# Patient Record
Sex: FEMALE | Race: WHITE | Employment: UNEMPLOYED | URBAN - METROPOLITAN AREA
[De-identification: names, ages, dates, MRNs, and addresses within clinical notes are randomized per-mention and may not be internally consistent; named-entity substitution may affect disease eponyms.]

---

## 2017-12-08 ENCOUNTER — HOSPITAL ENCOUNTER (OUTPATIENT)
Dept: MAMMOGRAPHY | Age: 57
Discharge: HOME | End: 2017-12-08
Attending: NURSE PRACTITIONER | Admitting: NURSE PRACTITIONER

## 2017-12-08 DIAGNOSIS — Z12.31 ENCOUNTER FOR SCREENING MAMMOGRAM FOR BREAST CANCER: ICD-10-CM

## 2017-12-26 ENCOUNTER — HOSPITAL ENCOUNTER (OUTPATIENT)
Dept: MAMMOGRAPHY | Age: 57
Discharge: HOME | End: 2017-12-26

## 2017-12-26 DIAGNOSIS — R92.8 ABNORMAL FINDING ON BREAST IMAGING: ICD-10-CM

## 2017-12-26 DIAGNOSIS — R92.8 ABNORMAL MAMMOGRAM: ICD-10-CM

## 2018-01-12 ENCOUNTER — NURSE ONLY (OUTPATIENT)
Dept: MAMMOGRAPHY | Age: 58
End: 2018-01-12

## 2018-01-12 ENCOUNTER — HOSPITAL ENCOUNTER (OUTPATIENT)
Dept: MAMMOGRAPHY | Age: 58
Discharge: HOME | End: 2018-01-12
Attending: NURSE PRACTITIONER | Admitting: NURSE PRACTITIONER

## 2018-01-12 DIAGNOSIS — Z98.890 STATUS POST BREAST BIOPSY: ICD-10-CM

## 2018-01-12 DIAGNOSIS — R92.8 ABNORMAL MAMMOGRAM: ICD-10-CM

## 2018-01-12 DIAGNOSIS — R92.0 ABNORMAL FINDING ON MAMMOGRAPHY, MICROCALCIFICATION: ICD-10-CM

## 2018-01-12 NOTE — PROGRESS NOTES
Patient in [facility] for breast biopsy. Radiologist reviews procedure with patient and consent is signed. Patient tolerates stereotactic breast biopsy- left and ultrasound guided breast biopsy well.  3 sites- one on left breast two on right breast. Compression held. Sites cleansed with chloraprep and steri strips and dry dressings applied. Ice packs provided. Discharge instructions reviewed, patient verbalizes understanding and copy given. Patient is A&Ox3 and steady on feet and discharged to waiting room where her  is waiting for her. Patient agrees to contact Nurse Navigator with any questions.

## 2018-01-24 ENCOUNTER — HOSPITAL ENCOUNTER (OUTPATIENT)
Dept: MRI IMAGING | Age: 58
Discharge: HOME | End: 2018-01-24
Attending: SURGERY | Admitting: SURGERY

## 2018-01-24 DIAGNOSIS — C50.411 MALIGNANT NEOPLASM OF UPPER-OUTER QUADRANT OF RIGHT FEMALE BREAST (HCC): ICD-10-CM

## 2018-01-24 DIAGNOSIS — C50.412 MALIGNANT NEOPLASM OF UPPER-OUTER QUADRANT OF LEFT FEMALE BREAST, UNSPECIFIED ESTROGEN RECEPTOR STATUS (HCC): ICD-10-CM

## 2018-01-24 DIAGNOSIS — C50.411 MALIGNANT NEOPLASM OF UPPER-OUTER QUADRANT OF RIGHT FEMALE BREAST, UNSPECIFIED ESTROGEN RECEPTOR STATUS (HCC): ICD-10-CM

## 2018-01-31 ENCOUNTER — HOSPITAL ENCOUNTER (OUTPATIENT)
Dept: MRI IMAGING | Age: 58
Discharge: HOME | End: 2018-01-31
Attending: SURGERY | Admitting: SURGERY

## 2018-01-31 DIAGNOSIS — C50.412 MALIGNANT NEOPLASM OF UPPER-OUTER QUADRANT OF LEFT FEMALE BREAST, UNSPECIFIED ESTROGEN RECEPTOR STATUS (HCC): ICD-10-CM

## 2018-01-31 DIAGNOSIS — C50.411 MALIGNANT NEOPLASM OF UPPER-OUTER QUADRANT OF RIGHT FEMALE BREAST, UNSPECIFIED ESTROGEN RECEPTOR STATUS (HCC): ICD-10-CM

## 2018-01-31 DIAGNOSIS — C50.411 MALIGNANT NEOPLASM OF UPPER-OUTER QUADRANT OF RIGHT FEMALE BREAST (HCC): ICD-10-CM

## 2018-02-09 PROBLEM — C50.411 BREAST CANCER OF UPPER-OUTER QUADRANT OF RIGHT FEMALE BREAST (HCC): Status: ACTIVE | Noted: 2018-02-09

## 2018-04-06 ENCOUNTER — HOSPITAL ENCOUNTER (OUTPATIENT)
Dept: GENERAL RADIOLOGY | Age: 58
Discharge: HOME | End: 2018-04-06
Attending: NURSE PRACTITIONER | Admitting: NURSE PRACTITIONER

## 2018-04-06 DIAGNOSIS — Z13.820 SCREENING FOR OSTEOPOROSIS: ICD-10-CM

## 2018-04-06 DIAGNOSIS — Z13.820 ENCOUNTER FOR SCREENING FOR OSTEOPOROSIS: ICD-10-CM

## 2018-07-12 ENCOUNTER — HOSPITAL ENCOUNTER (OUTPATIENT)
Dept: CT IMAGING | Age: 58
Discharge: HOME | End: 2018-07-12
Attending: INTERNAL MEDICINE | Admitting: INTERNAL MEDICINE

## 2018-07-12 DIAGNOSIS — C50.919 MALIGNANT NEOPLASM OF FEMALE BREAST, UNSPECIFIED ESTROGEN RECEPTOR STATUS, UNSPECIFIED LATERALITY, UNSPECIFIED SITE OF BREAST (HCC): ICD-10-CM

## 2018-07-12 DIAGNOSIS — C50.111 MALIGNANT NEOPLASM OF CENTRAL PORTION OF RIGHT FEMALE BREAST (HCC): ICD-10-CM

## 2018-10-31 ENCOUNTER — APPOINTMENT (OUTPATIENT)
Dept: GENERAL RADIOLOGY | Age: 58
End: 2018-10-31
Payer: COMMERCIAL

## 2018-10-31 ENCOUNTER — HOSPITAL ENCOUNTER (EMERGENCY)
Age: 58
Discharge: HOME | End: 2018-10-31
Payer: COMMERCIAL

## 2018-10-31 VITALS
BODY MASS INDEX: 28.32 KG/M2 | OXYGEN SATURATION: 98 % | HEIGHT: 65 IN | DIASTOLIC BLOOD PRESSURE: 87 MMHG | SYSTOLIC BLOOD PRESSURE: 150 MMHG | WEIGHT: 170 LBS | HEART RATE: 65 BPM | TEMPERATURE: 97.8 F | RESPIRATION RATE: 15 BRPM

## 2018-10-31 DIAGNOSIS — S93.401A SPRAIN OF RIGHT ANKLE, UNSPECIFIED LIGAMENT, INITIAL ENCOUNTER: Primary | ICD-10-CM

## 2018-10-31 PROCEDURE — 99283 EMERGENCY DEPT VISIT LOW MDM: CPT

## 2018-10-31 PROCEDURE — 73610 X-RAY EXAM OF ANKLE: CPT

## 2018-10-31 PROCEDURE — 6370000000 HC RX 637 (ALT 250 FOR IP): Performed by: PHYSICIAN ASSISTANT

## 2018-10-31 PROCEDURE — L4360 PNEUMAT WALKING BOOT PRE CST: HCPCS

## 2018-10-31 RX ORDER — HYDROCODONE BITARTRATE AND ACETAMINOPHEN 5; 325 MG/1; MG/1
1 TABLET ORAL EVERY 6 HOURS PRN
Qty: 10 TABLET | Refills: 0 | Status: SHIPPED | OUTPATIENT
Start: 2018-10-31 | End: 2018-11-03

## 2018-10-31 RX ORDER — HYDROCODONE BITARTRATE AND ACETAMINOPHEN 5; 325 MG/1; MG/1
1 TABLET ORAL ONCE
Status: COMPLETED | OUTPATIENT
Start: 2018-10-31 | End: 2018-10-31

## 2018-10-31 RX ADMIN — HYDROCODONE BITARTRATE AND ACETAMINOPHEN 1 TABLET: 5; 325 TABLET ORAL at 11:22

## 2018-10-31 ASSESSMENT — PAIN DESCRIPTION - DESCRIPTORS: DESCRIPTORS: CONSTANT;THROBBING

## 2018-10-31 ASSESSMENT — PAIN DESCRIPTION - LOCATION: LOCATION: ANKLE

## 2018-10-31 ASSESSMENT — PAIN SCALES - GENERAL: PAINLEVEL_OUTOF10: 9

## 2018-10-31 ASSESSMENT — PAIN DESCRIPTION - PAIN TYPE: TYPE: ACUTE PAIN

## 2018-10-31 ASSESSMENT — PAIN DESCRIPTION - ORIENTATION: ORIENTATION: LEFT

## 2018-10-31 NOTE — ED NOTES
Discharge instructions reviewed with Ms. Greetl Fuller. She verbalized understanding. Copy of discharge instructions and prescriptions given. She was advised not to drive, drink ETOH, operate machinery, or supervise small children after receiving pain medications here in the ED or while taking pain medications at home. She verbalized understanding. Ms. Gretel Fuller was discharged to home in good condition per personal vehicle, friend/family driving. She exited the ED without difficulty.       Ann Marie Salcedo RN  10/31/18 [id number]

## 2018-11-07 ENCOUNTER — OFFICE VISIT (OUTPATIENT)
Dept: ORTHOPEDIC SURGERY | Age: 58
End: 2018-11-07
Payer: COMMERCIAL

## 2018-11-07 ENCOUNTER — TELEPHONE (OUTPATIENT)
Dept: ORTHOPEDIC SURGERY | Age: 58
End: 2018-11-07

## 2018-11-07 VITALS
HEART RATE: 66 BPM | DIASTOLIC BLOOD PRESSURE: 76 MMHG | BODY MASS INDEX: 28.32 KG/M2 | SYSTOLIC BLOOD PRESSURE: 133 MMHG | WEIGHT: 169.97 LBS | HEIGHT: 65 IN

## 2018-11-07 DIAGNOSIS — M25.572 ACUTE LEFT ANKLE PAIN: ICD-10-CM

## 2018-11-07 DIAGNOSIS — M79.672 LEFT FOOT PAIN: Primary | ICD-10-CM

## 2018-11-07 DIAGNOSIS — S93.622A TARSOMETATARSAL (JOINT) (LIGAMENT) SPRAIN, LEFT, INITIAL ENCOUNTER: ICD-10-CM

## 2018-11-07 DIAGNOSIS — S93.402A MODERATE ANKLE SPRAIN, LEFT, INITIAL ENCOUNTER: ICD-10-CM

## 2018-11-07 PROCEDURE — L4361 PNEUMA/VAC WALK BOOT PRE OTS: HCPCS | Performed by: PODIATRIST

## 2018-11-07 PROCEDURE — 99203 OFFICE O/P NEW LOW 30 MIN: CPT | Performed by: PODIATRIST

## 2018-11-07 RX ORDER — ATORVASTATIN CALCIUM 10 MG/1
1 TABLET, FILM COATED ORAL
COMMUNITY
Start: 2018-09-14 | End: 2020-01-30 | Stop reason: CLARIF

## 2018-11-07 RX ORDER — TRAZODONE HYDROCHLORIDE 150 MG/1
TABLET ORAL
COMMUNITY
Start: 2018-09-13 | End: 2020-01-30 | Stop reason: CLARIF

## 2018-11-07 RX ORDER — ATORVASTATIN CALCIUM 10 MG/1
TABLET, FILM COATED ORAL
COMMUNITY
Start: 2018-09-14 | End: 2020-01-30 | Stop reason: CLARIF

## 2018-11-07 RX ORDER — PROMETHAZINE HYDROCHLORIDE 25 MG/1
TABLET ORAL
COMMUNITY
Start: 2018-10-30 | End: 2018-11-07

## 2018-11-14 ENCOUNTER — OFFICE VISIT (OUTPATIENT)
Dept: ORTHOPEDIC SURGERY | Age: 58
End: 2018-11-14
Payer: COMMERCIAL

## 2018-11-14 VITALS
WEIGHT: 169.97 LBS | DIASTOLIC BLOOD PRESSURE: 74 MMHG | SYSTOLIC BLOOD PRESSURE: 130 MMHG | HEART RATE: 61 BPM | HEIGHT: 65 IN | BODY MASS INDEX: 28.32 KG/M2

## 2018-11-14 DIAGNOSIS — S93.402A MODERATE ANKLE SPRAIN, LEFT, INITIAL ENCOUNTER: Primary | ICD-10-CM

## 2018-11-14 DIAGNOSIS — S93.622A TARSOMETATARSAL (JOINT) (LIGAMENT) SPRAIN, LEFT, INITIAL ENCOUNTER: ICD-10-CM

## 2018-11-14 PROBLEM — Z90.13 ACQUIRED ABSENCE OF BOTH BREASTS AND NIPPLES: Status: ACTIVE | Noted: 2018-05-31

## 2018-11-14 PROBLEM — C50.919 MALIGNANT NEOPLASM OF FEMALE BREAST (HCC): Status: ACTIVE | Noted: 2018-02-09

## 2018-11-14 PROBLEM — Z85.3 HISTORY OF BREAST CANCER: Status: ACTIVE | Noted: 2018-11-01

## 2018-11-14 PROBLEM — C50.919: Status: ACTIVE | Noted: 2018-08-30

## 2018-11-14 PROBLEM — E78.5 HYPERLIPIDEMIA: Status: ACTIVE | Noted: 2018-09-27

## 2018-11-14 PROCEDURE — 99213 OFFICE O/P EST LOW 20 MIN: CPT | Performed by: PODIATRIST

## 2018-11-14 PROCEDURE — L1902 AFO ANKLE GAUNTLET PRE OTS: HCPCS | Performed by: PODIATRIST

## 2018-11-14 RX ORDER — ERGOCALCIFEROL 1.25 MG/1
CAPSULE ORAL
COMMUNITY
Start: 2018-09-12 | End: 2020-01-30 | Stop reason: CLARIF

## 2018-11-19 ENCOUNTER — HOSPITAL ENCOUNTER (OUTPATIENT)
Dept: PHYSICAL THERAPY | Age: 58
Setting detail: THERAPIES SERIES
Discharge: HOME | End: 2018-11-19
Payer: COMMERCIAL

## 2018-11-19 PROCEDURE — 97110 THERAPEUTIC EXERCISES: CPT

## 2018-11-19 PROCEDURE — G8979 MOBILITY GOAL STATUS: HCPCS

## 2018-11-19 PROCEDURE — 97161 PT EVAL LOW COMPLEX 20 MIN: CPT

## 2018-11-19 PROCEDURE — G8978 MOBILITY CURRENT STATUS: HCPCS

## 2018-11-19 NOTE — PROGRESS NOTES
throbbing   Patient reports pain is  3/10 pain at present and  4/10 pain at its worst.  Worsened by  being a dependent position, mobility  Improved by  Ice   Pt. reports pain with coughing, sneezing and laughing:   []Yes   []No   [x]NA   Pt. reports bowel and bladder changes:   []Yes   []No   [x]NA   Pt. reports knee/hip/ankle crepitus, locking, buckling:   []Yes   [x]No   []NA     Current Functional Limitations:   [x]Yes   []No  Functional complaints:      PLOF:   []No functional limitations   [x]Pt with previous functional limitations: unable to ascend stair reciprocally (has to side step holding on to HR), limited tolerance for shopping, walking for an hour before sitting down, 30 minutes of sitting before increase in symptoms  Pt's sleep is affected? []Yes   [x]No     Patient goal for therapy: \"To get back to walking\"          OBJECTIVE FINDINGS         Gait/Steps/Balance       [x]Gait WNL unless otherwise noted below:                             Deviations on a level linoleum surface include:  In CAM boot, decreased step length on R, increased lateral trunk lean       []Steps WNL (reciprocal pattern with 0-1 rail) unless otherwise noted below:    Deviations include:     [x]All balance WNL unless otherwise noted below:      Static/ Dynamic Sitting:     Static/Dynamic Standing:      Quick Tests/Functional Myotome Tests:   Unilateral   sit to stand (L1-3)   [x]Able to perform WNL   []Unable to perform   []NT  Heel Walk (L4):      []Able to perform WNL   []Unable to perform   [x]NT      Toe Walk (S1):       []Able to perform WNL   []Unable to perform   [x]NT     Sensation/Myotomes NT   []All dermatomes WNL for light touch except as marked below   []All  myotomes WNL (5/5) except as marked below (measured out of 5)       Dermatome Left Right Myotome Left Right   Anterior groin, 2-3 inches below ASIS (L1-L2)   Hip Flexion (L1-L2)     Middle third anterior thigh (L3)   Hip adduction (L3)     Patella and med

## 2018-11-19 NOTE — FLOWSHEET NOTE
Therapeutic Exercise/Home Exercise Program:   44 minutes    HEP established, pt instructed on HEP and handout with illustrations provided  Pt educated on physiology of injury and healing process, all pt questions answered to pt satisfaction    Group Therapy:    0 minutes    Therapeutic Activity:  0 minutes     Gait: 0 minutes    Neuromuscular Re-Education:  0 minutes      Canalith Repositioning Procedure:  0 minutes     Manual Therapy:  0 minutes    Modalities: 0 minutes    GCODEs and Functional Outcome Measure:    PT G-Codes  Functional Assessment Tool Used: LEFS  Score: 40/80= 50% disability  Functional Limitation: Mobility: Walking and moving around  Mobility: Walking and Moving Around Current Status (): At least 40 percent but less than 60 percent impaired, limited or restricted  Mobility: Walking and Moving Around Goal Status (): At least 20 percent but less than 40 percent impaired, limited or restricted    Assessment/Treatment/Activity Tolerance:    Patients response to treatment:   [x]Patient tolerated treatment well []Patient limited by fatigue   [x]Patient limited by pain []Patient limited by other medical complications   []Other:     Goals:   Progress towards goals:  Goals established 11/19/18     Short Term Goals:   2  weeks Long Term Goals:  3   weeks   1). Establish HEP  1). Pt independent with HEP   2). Pain  3/10 or less 2). Pain  2/10 or less   3). Pt will tolerate transition to soft brace with shoe as measured by 8 hr day typical activity without increase in pain greater than 4/10 3). Pt able to sit 40 min without increase in pain in L ankle greater than 3/10   4). Pt will tolerate ROM and strengthening exercises for L LE during treatment sessions 4). Pt able to ascend/descend 5 stairs with reciprocal pattern with B HR and use of soft brace w/ shoe   5). 5). Pt will improve LEFS to at least 48/80   6). 6).        Prognosis: [x]Good

## 2018-11-21 ENCOUNTER — HOSPITAL ENCOUNTER (OUTPATIENT)
Dept: PHYSICAL THERAPY | Age: 58
Setting detail: THERAPIES SERIES
Discharge: HOME | End: 2018-11-21
Payer: COMMERCIAL

## 2018-11-21 PROCEDURE — 97110 THERAPEUTIC EXERCISES: CPT

## 2018-11-21 NOTE — FLOWSHEET NOTE
Pt will improve LEFS to at least 48/80   6). 6).        Prognosis: [x]Good   []Fair   []Poor    Patient Requires Follow-up:  [x]Yes  []No    Plan: [x]Continue per plan of care []Alter current plan (see comments)   []Plan of care initiated []Hold pending MD visit []Discharge    Timed Code Treatment Minutes: 45     Total Treatment Minutes:  45    Electronically signed by: Maciel Spring PT, DPT #[id number]

## 2018-11-26 ENCOUNTER — HOSPITAL ENCOUNTER (OUTPATIENT)
Age: 58
Discharge: HOME | End: 2018-11-26
Payer: COMMERCIAL

## 2018-11-26 ENCOUNTER — HOSPITAL ENCOUNTER (OUTPATIENT)
Dept: CT IMAGING | Age: 58
Discharge: HOME | End: 2018-11-26
Payer: COMMERCIAL

## 2018-11-26 DIAGNOSIS — C50.919 MALIGNANT NEOPLASM OF FEMALE BREAST, UNSPECIFIED ESTROGEN RECEPTOR STATUS, UNSPECIFIED LATERALITY, UNSPECIFIED SITE OF BREAST (HCC): ICD-10-CM

## 2018-11-26 LAB
A/G RATIO: 1.6 (ref 1.1–2.2)
ALBUMIN SERPL-MCNC: 4.4 G/DL (ref 3.4–5)
ALP BLD-CCNC: 61 U/L (ref 40–129)
ALT SERPL-CCNC: 11 U/L (ref 10–40)
ANION GAP SERPL CALCULATED.3IONS-SCNC: 8 MMOL/L (ref 3–16)
AST SERPL-CCNC: 14 U/L (ref 15–37)
BILIRUB SERPL-MCNC: 0.3 MG/DL (ref 0–1)
BUN BLDV-MCNC: 9 MG/DL (ref 7–20)
CALCIUM SERPL-MCNC: 9.5 MG/DL (ref 8.3–10.6)
CHLORIDE BLD-SCNC: 105 MMOL/L (ref 99–110)
CO2: 28 MMOL/L (ref 21–32)
CREAT SERPL-MCNC: 0.8 MG/DL (ref 0.6–1.2)
GFR AFRICAN AMERICAN: >60
GFR NON-AFRICAN AMERICAN: >60
GLOBULIN: 2.8 G/DL
GLUCOSE BLD-MCNC: 98 MG/DL (ref 70–99)
POTASSIUM SERPL-SCNC: 4.5 MMOL/L (ref 3.5–5.1)
SODIUM BLD-SCNC: 141 MMOL/L (ref 136–145)
TOTAL PROTEIN: 7.2 G/DL (ref 6.4–8.2)

## 2018-11-26 PROCEDURE — 36415 COLL VENOUS BLD VENIPUNCTURE: CPT

## 2018-11-26 PROCEDURE — 71260 CT THORAX DX C+: CPT

## 2018-11-26 PROCEDURE — 74177 CT ABD & PELVIS W/CONTRAST: CPT

## 2018-11-26 PROCEDURE — 80053 COMPREHEN METABOLIC PANEL: CPT

## 2018-11-26 PROCEDURE — 6360000004 HC RX CONTRAST MEDICATION: Performed by: INTERNAL MEDICINE

## 2018-11-26 RX ADMIN — IOHEXOL 50 ML: 240 INJECTION, SOLUTION INTRATHECAL; INTRAVASCULAR; INTRAVENOUS; ORAL at 14:33

## 2018-11-26 RX ADMIN — IOPAMIDOL 75 ML: 755 INJECTION, SOLUTION INTRAVENOUS at 14:32

## 2018-11-26 NOTE — PROGRESS NOTES
Physical Therapy  [facility] contacted to check on authorization.   Request was received & it is in process

## 2018-11-27 ENCOUNTER — HOSPITAL ENCOUNTER (OUTPATIENT)
Dept: PHYSICAL THERAPY | Age: 58
Setting detail: THERAPIES SERIES
Discharge: HOME | End: 2018-11-27
Payer: COMMERCIAL

## 2018-11-27 PROCEDURE — 97110 THERAPEUTIC EXERCISES: CPT

## 2018-11-28 NOTE — PROGRESS NOTES
Physical Therapy  Patient called to cancel appointment for 11/29/18.   No reason given   Next visit confirmed

## 2018-11-29 ENCOUNTER — HOSPITAL ENCOUNTER (OUTPATIENT)
Dept: PHYSICAL THERAPY | Age: 58
Setting detail: THERAPIES SERIES
Discharge: HOME | End: 2018-11-29
Payer: COMMERCIAL

## 2018-12-03 ENCOUNTER — HOSPITAL ENCOUNTER (OUTPATIENT)
Dept: PHYSICAL THERAPY | Age: 58
Setting detail: THERAPIES SERIES
Discharge: HOME | End: 2018-12-03
Payer: COMMERCIAL

## 2018-12-03 PROCEDURE — 97110 THERAPEUTIC EXERCISES: CPT

## 2018-12-03 PROCEDURE — 97140 MANUAL THERAPY 1/> REGIONS: CPT

## 2018-12-05 ENCOUNTER — APPOINTMENT (OUTPATIENT)
Dept: PHYSICAL THERAPY | Age: 58
End: 2018-12-05
Payer: COMMERCIAL

## 2018-12-06 ENCOUNTER — APPOINTMENT (OUTPATIENT)
Dept: PHYSICAL THERAPY | Age: 58
End: 2018-12-06
Payer: COMMERCIAL

## 2018-12-10 ENCOUNTER — HOSPITAL ENCOUNTER (OUTPATIENT)
Dept: PHYSICAL THERAPY | Age: 58
Setting detail: THERAPIES SERIES
Discharge: HOME | End: 2018-12-10
Payer: COMMERCIAL

## 2018-12-10 PROCEDURE — 97140 MANUAL THERAPY 1/> REGIONS: CPT

## 2018-12-10 PROCEDURE — 97110 THERAPEUTIC EXERCISES: CPT

## 2018-12-12 ENCOUNTER — APPOINTMENT (OUTPATIENT)
Dept: PHYSICAL THERAPY | Age: 58
End: 2018-12-12
Payer: COMMERCIAL

## 2018-12-17 ENCOUNTER — HOSPITAL ENCOUNTER (OUTPATIENT)
Dept: PHYSICAL THERAPY | Age: 58
Setting detail: THERAPIES SERIES
Discharge: HOME | End: 2018-12-17
Payer: COMMERCIAL

## 2018-12-17 PROCEDURE — 97140 MANUAL THERAPY 1/> REGIONS: CPT

## 2018-12-17 PROCEDURE — G8978 MOBILITY CURRENT STATUS: HCPCS

## 2018-12-17 PROCEDURE — 97110 THERAPEUTIC EXERCISES: CPT

## 2018-12-17 PROCEDURE — G8979 MOBILITY GOAL STATUS: HCPCS

## 2018-12-17 NOTE — PROGRESS NOTES
Outpatient Physical Therapy     [x] Daily Treatment Note   [x] Progress Note   [] Discharge Note    Date:  12/17/2018    Patient Name:  Silvio Phillips         YOB: 1960    Medical Diagnosis: Moderate ankle sprain-left, Tarsometatarsal ligament sprain     ICD 10:  S93.402A, W88.842G       Treatment Diagnosis:   L ankle instability and weakness     Onset Date: 10/30/18                            Referral Date: 11/14/18                            Referring Physician: Dr. Yusra Oneal DPM      Visits Allowed/Insurance/Certification Information:  [id number] Approved per [place]  11/19 - 01/03/19    Restrictions/Precautions: \"Transition out of CAM boot into soft brace with supportive shoe in 1 week (11/21)  ROM exercise, progress to strengthening and proprioceptive exercises\"    Plan of care sent to provider:   [x]NA   []Faxed   []Co-signature       Plan of care signed:   [x]NA   []Yes   []No      Progress Note covers period from (if applicable):    []NA    [x]From 11/19/18 To 12/17/18           Next Progress Note due:       Visit# / total visits:  6/9 (7 Approved per Dewey)    Plan for Next Session:  Continue mobilization, Review and progress exercises,  Ultrasound only if needed. Subjective:   Pt states she has weaned some from the ankle brace this week. Pt states at first she was out of it completely and then was on her feet a lot doing family Nidia stuff. But then pt states she wore her brace when she was up on her feet a lot. Pt states sometimes her  massages foot and it helps. Pt states she is to not to return to MD unless needed and does not have follow up appt. PT reviewed last ortho note, Dr. Talia Kwok states for pt to follow up in 3-4 weeks. Pt notified of this and PT will leave it up to pt to decide if she needs to return to ortho. Pt states she is able to sit without increased pain.           Pain level: 0/10 currently, pain as high as ascend/descend 5 stairs with reciprocal pattern with B HR and use of soft brace w/ shoe   5). 5). Pt will improve LEFS to at least 48/80 MET   6). 6).        Prognosis: [x]Good   []Fair   []Poor    Patient Requires Follow-up:  [x]Yes  []No    Plan: [x]Continue per plan of care []Alter current plan (see comments)   []Plan of care initiated []Hold pending MD visit []Discharge    Timed Code Treatment Minutes: 45     Total Treatment Minutes:  45     Electronically signed by: Soila Atkinson PT, OMT-C, [id number]

## 2018-12-24 ENCOUNTER — HOSPITAL ENCOUNTER (OUTPATIENT)
Dept: PHYSICAL THERAPY | Age: 58
Setting detail: THERAPIES SERIES
Discharge: HOME | End: 2018-12-24
Payer: COMMERCIAL

## 2018-12-24 ENCOUNTER — HOSPITAL ENCOUNTER (OUTPATIENT)
Dept: ULTRASOUND IMAGING | Age: 58
Discharge: HOME | End: 2018-12-24
Payer: COMMERCIAL

## 2018-12-24 ENCOUNTER — APPOINTMENT (OUTPATIENT)
Dept: PHYSICAL THERAPY | Age: 58
End: 2018-12-24
Payer: COMMERCIAL

## 2018-12-24 DIAGNOSIS — N64.4 BREAST TENDERNESS IN FEMALE: ICD-10-CM

## 2018-12-24 PROCEDURE — 76642 ULTRASOUND BREAST LIMITED: CPT

## 2018-12-24 NOTE — FLOWSHEET NOTE
Physical Therapy  Cancellation/No-show Note    Patient Name:  Radha Farr  :  1960   Date:  2018  Cancels to Date: 2  No-shows to Date: 0    For today's appointment patient:  [x]  Cancelled  [x]  Rescheduled appointment  []  No-show     Reason given by patient:  []  Patient ill  []  Conflicting appointment  []  No transportation    []  Conflict with work  []  No reason given  []  Other:     Comments:  Pt arrived 1 hour before appt time asking to be seen early. Unable to accommodate pt as there were no PTs in the office yet. Pt rescheduled for next week.      Electronically signed by:  Lisa Winter, [id number]

## 2019-01-02 ENCOUNTER — APPOINTMENT (OUTPATIENT)
Dept: PHYSICAL THERAPY | Age: 59
End: 2019-01-02
Payer: COMMERCIAL

## 2019-01-02 ENCOUNTER — HOSPITAL ENCOUNTER (OUTPATIENT)
Dept: PHYSICAL THERAPY | Age: 59
Setting detail: THERAPIES SERIES
Discharge: HOME | End: 2019-01-02
Payer: COMMERCIAL

## 2019-01-02 PROCEDURE — 97140 MANUAL THERAPY 1/> REGIONS: CPT

## 2019-01-02 PROCEDURE — 97110 THERAPEUTIC EXERCISES: CPT

## 2019-01-02 PROCEDURE — G8979 MOBILITY GOAL STATUS: HCPCS

## 2019-01-02 PROCEDURE — G8980 MOBILITY D/C STATUS: HCPCS

## 2020-01-07 ENCOUNTER — HOSPITAL ENCOUNTER (OUTPATIENT)
Dept: PULMONOLOGY | Age: 60
Discharge: HOME | End: 2020-01-07
Payer: COMMERCIAL

## 2020-01-07 VITALS — OXYGEN SATURATION: 96 %

## 2020-01-07 LAB
EXPIRATORY TIME-POST: NORMAL
EXPIRATORY TIME: NORMAL
FEF 25-75% %CHNG: NORMAL
FEF 25-75% %PRED-POST: NORMAL
FEF 25-75% %PRED-PRE: NORMAL
FEF 25-75% PRED: NORMAL
FEF 25-75%-POST: NORMAL
FEF 25-75%-PRE: NORMAL
FEV1 %PRED-POST: 79 %
FEV1 %PRED-PRE: 82 %
FEV1 PRED: NORMAL
FEV1-POST: NORMAL
FEV1-PRE: NORMAL
FEV1/FVC %PRED-POST: NORMAL
FEV1/FVC %PRED-PRE: NORMAL
FEV1/FVC PRED: NORMAL
FEV1/FVC-POST: 64 %
FEV1/FVC-PRE: 67 %
FVC %PRED-POST: NORMAL
FVC %PRED-PRE: NORMAL
FVC PRED: NORMAL
FVC-POST: NORMAL
FVC-PRE: NORMAL
PEF %PRED-POST: NORMAL
PEF %PRED-PRE: NORMAL
PEF PRED: NORMAL
PEF%CHNG: NORMAL
PEF-POST: NORMAL
PEF-PRE: NORMAL

## 2020-01-07 PROCEDURE — 94760 N-INVAS EAR/PLS OXIMETRY 1: CPT

## 2020-01-07 PROCEDURE — 6360000002 HC RX W HCPCS: Performed by: FAMILY MEDICINE

## 2020-01-07 PROCEDURE — 94640 AIRWAY INHALATION TREATMENT: CPT

## 2020-01-07 PROCEDURE — 94060 EVALUATION OF WHEEZING: CPT

## 2020-01-07 RX ORDER — ALBUTEROL SULFATE 2.5 MG/3ML
2.5 SOLUTION RESPIRATORY (INHALATION) ONCE
Status: COMPLETED | OUTPATIENT
Start: 2020-01-07 | End: 2020-01-07

## 2020-01-07 RX ADMIN — ALBUTEROL SULFATE 2.5 MG: 2.5 SOLUTION RESPIRATORY (INHALATION) at 09:11

## 2020-01-07 ASSESSMENT — PULMONARY FUNCTION TESTS
FEV1/FVC_POST: 64
FEV1/FVC_PRE: 67
FEV1_PERCENT_PREDICTED_PRE: 82
FEV1_PERCENT_PREDICTED_POST: 79

## 2020-01-15 ENCOUNTER — HOSPITAL ENCOUNTER (OUTPATIENT)
Dept: CT IMAGING | Age: 60
Discharge: HOME | End: 2020-01-15
Payer: COMMERCIAL

## 2020-01-15 PROCEDURE — 74177 CT ABD & PELVIS W/CONTRAST: CPT

## 2020-01-15 PROCEDURE — 70470 CT HEAD/BRAIN W/O & W/DYE: CPT

## 2020-01-15 PROCEDURE — 6360000004 HC RX CONTRAST MEDICATION: Performed by: INTERNAL MEDICINE

## 2020-01-15 RX ADMIN — IOPAMIDOL 75 ML: 755 INJECTION, SOLUTION INTRAVENOUS at 09:02

## 2020-01-15 RX ADMIN — IOHEXOL 50 ML: 240 INJECTION, SOLUTION INTRATHECAL; INTRAVASCULAR; INTRAVENOUS; ORAL at 09:02

## 2020-01-30 ENCOUNTER — OFFICE VISIT (OUTPATIENT)
Dept: PULMONOLOGY | Age: 60
End: 2020-01-30
Payer: COMMERCIAL

## 2020-01-30 VITALS
RESPIRATION RATE: 16 BRPM | SYSTOLIC BLOOD PRESSURE: 138 MMHG | WEIGHT: 182 LBS | BODY MASS INDEX: 30.32 KG/M2 | HEIGHT: 65 IN | DIASTOLIC BLOOD PRESSURE: 86 MMHG | HEART RATE: 100 BPM | OXYGEN SATURATION: 96 %

## 2020-01-30 PROBLEM — J44.9 COPD, MILD (HCC): Status: ACTIVE | Noted: 2020-01-30

## 2020-01-30 PROCEDURE — 99244 OFF/OP CNSLTJ NEW/EST MOD 40: CPT | Performed by: INTERNAL MEDICINE

## 2020-01-30 RX ORDER — ALBUTEROL SULFATE 90 UG/1
AEROSOL, METERED RESPIRATORY (INHALATION)
COMMUNITY
Start: 2020-01-27

## 2020-01-30 RX ORDER — PREDNISONE 10 MG/1
30 TABLET ORAL DAILY
Qty: 15 TABLET | Refills: 0 | Status: SHIPPED | OUTPATIENT
Start: 2020-01-30 | End: 2020-02-04

## 2020-01-30 RX ORDER — PSEUDOEPHEDRINE HCL 120 MG/1
120 TABLET, FILM COATED, EXTENDED RELEASE ORAL EVERY 12 HOURS
Qty: 28 TABLET | Refills: 0 | Status: SHIPPED | OUTPATIENT
Start: 2020-01-30 | End: 2020-02-13

## 2020-01-30 NOTE — PROGRESS NOTES
Cancer ([address]     breast ca    Headache(784.0)     migraines    Hemorrhoids     Hives     Hyperlipidemia 9/27/2018    Infectious mononucleosis     Pneumonia     Venereal disease      Past Surgical History      Procedure Laterality Date    BREAST SURGERY      BREAST SURGERY Bilateral 02/09/2018    BILATERAL SIMPLE MASTECTOMIES; RIGHT SENTINEL NODE BIOPSY; BILATERAL FIRST STAGE BREAST RECONSTRUCTION    HYSTERECTOMY      LIPOSUCTION      MASTECTOMY, BILATERAL       Social History:    TOBACCO:   reports that she quit smoking about 20 years ago. She has a 18.00 pack-year smoking history. She has never used smokeless tobacco.  ETOH:   reports current alcohol use. Family History      Problem Relation Age of Onset    Cancer Mother     Heart Disease Mother     Cancer Father      Allergies:  is allergic to no known allergies; codeine; and nickel. Current Outpatient Medications:     albuterol sulfate  (90 Base) MCG/ACT inhaler, , Disp: , Rfl:     Objective:   PHYSICAL EXAM:   /86   Pulse 100   Resp 16   Ht 5' 5\" (1.651 m)   Wt 182 lb (82.6 kg)   SpO2 96% Comment: ra  BMI 30.29 kg/m²    Constitutional:  No acute distress. Eyes: PERRL. Conjunctivae anicteric. ENT: Normal nose. Normal tongue. Oropharynx is clear. Neck:  Trachea is midline. No thyroid tenderness. Respiratory: No accessory muscle usage. No decreased breath sounds. No wheezes. No rales. No Rhonchi. Cardiovascular: Normal S1S2. No digit clubbing. No digit cyanosis. No LE edema. Lymphatic: No cervical or supraclavicular adenopathy. Skin: No rash on the exposed extremities. No Nodules or induration on exposed extremities. Psychiatric: No anxiety or Agitation. Alert and Oriented to person, place and time.     LABS:  The recent relevant labs were reviewed    PFTs 1/7/20 FVC  (%) FEV1  (82%) FEV1/FVC ratio  0.67 TLC  (%)  RV  (%)   DLCO (%) Bronchodilator response: No    IMAGING:  I personally reviewed and interpreted the following imaging today in the office:   1/15/20  Chest CT:  Stable chest CT. Luigi Neighbours is underlying emphysema.  Small nodule along the   minor fissure on the right appears similar, likely intrapulmonary lymph node. More focal area of sclerosis at the T1 level is redemonstrated, similar to   prior. ASSESSMENT:  · Mild COPD  · Prior tobacco abuse  · Sinus and nasal congestion ongoing - clear lungs on chest ct and physical exam  · H/o Breast cancer s/p bilateral mastectomy; no chemoradiation      PLAN:   · Continue PRN albuterol  Trial of pred 30mg for 5 days and pseudophed for her sinus congestion.  Already completed abx  F/u for COPD in 1 year

## 2020-07-27 ENCOUNTER — HOSPITAL ENCOUNTER (OUTPATIENT)
Dept: GENERAL RADIOLOGY | Age: 60
Discharge: HOME | End: 2020-07-27
Payer: COMMERCIAL

## 2020-07-27 PROCEDURE — 77080 DXA BONE DENSITY AXIAL: CPT

## 2021-01-28 ENCOUNTER — TELEPHONE (OUTPATIENT)
Dept: PULMONOLOGY | Age: 61
End: 2021-01-28

## 2021-01-28 NOTE — TELEPHONE ENCOUNTER
Patient cancelled appointment on 1/29/21 with Dr Annie Garcia for 1 yr fu.    Reason: patient would like to wait until she schedules scan. Patient states she will call back to r/s    Patient did not reschedule appointment. ASSESSMENT: 1/30/20  · Mild COPD  · Prior tobacco abuse  · Sinus and nasal congestion ongoing - clear lungs on chest ct and physical exam  · H/o Breast cancer s/p bilateral mastectomy; no chemoradiation        PLAN:   · Continue PRN albuterol  · Trial of pred 30mg for 5 days and pseudophed for her sinus congestion.  Already completed abx  · F/u for COPD in 1 year

## 2021-02-04 NOTE — TELEPHONE ENCOUNTER
Called patient who declined to reschedule CT as well as follow up. Patient stated that she was going to wait 6 month to reschedule and call the office.

## 2021-07-01 ENCOUNTER — HOSPITAL ENCOUNTER (OUTPATIENT)
Dept: CT IMAGING | Age: 61
Discharge: HOME | End: 2021-07-01
Payer: COMMERCIAL

## 2021-07-01 DIAGNOSIS — J43.1 ALVEOLAR EMPHYSEMA OF LUNG (HCC): ICD-10-CM

## 2021-07-01 DIAGNOSIS — C50.919: ICD-10-CM

## 2021-07-01 LAB
GFR AFRICAN AMERICAN: >60
GFR NON-AFRICAN AMERICAN: 56
PERFORMED ON: ABNORMAL
POC CREATININE: 1 MG/DL (ref 0.6–1.2)
POC SAMPLE TYPE: ABNORMAL

## 2021-07-01 PROCEDURE — 71260 CT THORAX DX C+: CPT

## 2021-07-01 PROCEDURE — 6360000004 HC RX CONTRAST MEDICATION: Performed by: NURSE PRACTITIONER

## 2021-07-01 PROCEDURE — 82565 ASSAY OF CREATININE: CPT

## 2021-07-01 RX ADMIN — IOPAMIDOL 75 ML: 755 INJECTION, SOLUTION INTRAVENOUS at 13:15

## 2021-07-08 ENCOUNTER — HOSPITAL ENCOUNTER (OUTPATIENT)
Dept: WOMENS IMAGING | Age: 61
Discharge: HOME | End: 2021-07-08
Payer: COMMERCIAL

## 2021-07-08 DIAGNOSIS — J43.1 ALVEOLAR EMPHYSEMA OF LUNG (HCC): ICD-10-CM

## 2021-07-08 DIAGNOSIS — C50.919: ICD-10-CM

## 2021-07-08 DIAGNOSIS — N95.9 MENOPAUSAL DISORDER: ICD-10-CM

## 2021-07-08 PROCEDURE — 77080 DXA BONE DENSITY AXIAL: CPT

## 2021-09-03 ENCOUNTER — APPOINTMENT (OUTPATIENT)
Dept: CT IMAGING | Age: 61
End: 2021-09-03
Payer: COMMERCIAL

## 2021-09-03 ENCOUNTER — HOSPITAL ENCOUNTER (EMERGENCY)
Age: 61
Discharge: HOME | End: 2021-09-04
Attending: EMERGENCY MEDICINE
Payer: COMMERCIAL

## 2021-09-03 ENCOUNTER — APPOINTMENT (OUTPATIENT)
Dept: GENERAL RADIOLOGY | Age: 61
End: 2021-09-03
Payer: COMMERCIAL

## 2021-09-03 DIAGNOSIS — R42 LIGHTHEADEDNESS: ICD-10-CM

## 2021-09-03 DIAGNOSIS — R11.2 NON-INTRACTABLE VOMITING WITH NAUSEA, UNSPECIFIED VOMITING TYPE: ICD-10-CM

## 2021-09-03 DIAGNOSIS — R07.9 CHEST PAIN, UNSPECIFIED TYPE: Primary | ICD-10-CM

## 2021-09-03 DIAGNOSIS — Q28.1: ICD-10-CM

## 2021-09-03 LAB
A/G RATIO: 1.4 (ref 1.1–2.2)
ALBUMIN SERPL-MCNC: 4.3 G/DL (ref 3.4–5)
ALP BLD-CCNC: 70 U/L (ref 40–129)
ALT SERPL-CCNC: 8 U/L (ref 10–40)
ANION GAP SERPL CALCULATED.3IONS-SCNC: -2 MMOL/L (ref 3–16)
AST SERPL-CCNC: 13 U/L (ref 15–37)
BASOPHILS ABSOLUTE: 0.1 K/UL (ref 0–0.2)
BASOPHILS RELATIVE PERCENT: 0.9 %
BILIRUB SERPL-MCNC: <0.2 MG/DL (ref 0–1)
BUN BLDV-MCNC: 14 MG/DL (ref 7–20)
CALCIUM SERPL-MCNC: 9.9 MG/DL (ref 8.3–10.6)
CHLORIDE BLD-SCNC: 107 MMOL/L (ref 99–110)
CO2: 26 MMOL/L (ref 21–32)
CREAT SERPL-MCNC: 0.9 MG/DL (ref 0.6–1.2)
EOSINOPHILS ABSOLUTE: 0.3 K/UL (ref 0–0.6)
EOSINOPHILS RELATIVE PERCENT: 3.9 %
ETHANOL: NORMAL MG/DL (ref 0–0.08)
GFR AFRICAN AMERICAN: >60
GFR NON-AFRICAN AMERICAN: >60
GLOBULIN: 3.1 G/DL
GLUCOSE BLD-MCNC: 111 MG/DL (ref 70–99)
HCT VFR BLD CALC: 40 % (ref 36–48)
HEMOGLOBIN: 13.2 G/DL (ref 12–16)
LIPASE: 52 U/L (ref 13–60)
LYMPHOCYTES ABSOLUTE: 2.3 K/UL (ref 1–5.1)
LYMPHOCYTES RELATIVE PERCENT: 32.4 %
MAGNESIUM: 2.1 MG/DL (ref 1.8–2.4)
MCH RBC QN AUTO: 28.1 PG (ref 26–34)
MCHC RBC AUTO-ENTMCNC: 33 G/DL (ref 31–36)
MCV RBC AUTO: 85.1 FL (ref 80–100)
MONOCYTES ABSOLUTE: 0.4 K/UL (ref 0–1.3)
MONOCYTES RELATIVE PERCENT: 5.9 %
NEUTROPHILS ABSOLUTE: 4 K/UL (ref 1.7–7.7)
NEUTROPHILS RELATIVE PERCENT: 56.9 %
PDW BLD-RTO: 14.6 % (ref 12.4–15.4)
PLATELET # BLD: 203 K/UL (ref 135–450)
PMV BLD AUTO: 9.2 FL (ref 5–10.5)
POTASSIUM SERPL-SCNC: 3.4 MMOL/L (ref 3.5–5.1)
PRO-BNP: 80 PG/ML (ref 0–124)
RBC # BLD: 4.7 M/UL (ref 4–5.2)
SODIUM BLD-SCNC: 131 MMOL/L (ref 136–145)
TOTAL PROTEIN: 7.4 G/DL (ref 6.4–8.2)
TROPONIN: <0.01 NG/ML
WBC # BLD: 7.1 K/UL (ref 4–11)

## 2021-09-03 PROCEDURE — 83735 ASSAY OF MAGNESIUM: CPT

## 2021-09-03 PROCEDURE — 93005 ELECTROCARDIOGRAM TRACING: CPT | Performed by: EMERGENCY MEDICINE

## 2021-09-03 PROCEDURE — 71045 X-RAY EXAM CHEST 1 VIEW: CPT

## 2021-09-03 PROCEDURE — 85025 COMPLETE CBC W/AUTO DIFF WBC: CPT

## 2021-09-03 PROCEDURE — 2580000003 HC RX 258: Performed by: EMERGENCY MEDICINE

## 2021-09-03 PROCEDURE — 99284 EMERGENCY DEPT VISIT MOD MDM: CPT

## 2021-09-03 PROCEDURE — 70498 CT ANGIOGRAPHY NECK: CPT

## 2021-09-03 PROCEDURE — 70450 CT HEAD/BRAIN W/O DYE: CPT

## 2021-09-03 PROCEDURE — 83690 ASSAY OF LIPASE: CPT

## 2021-09-03 PROCEDURE — 82077 ASSAY SPEC XCP UR&BREATH IA: CPT

## 2021-09-03 PROCEDURE — 96374 THER/PROPH/DIAG INJ IV PUSH: CPT

## 2021-09-03 PROCEDURE — 83880 ASSAY OF NATRIURETIC PEPTIDE: CPT

## 2021-09-03 PROCEDURE — 6370000000 HC RX 637 (ALT 250 FOR IP): Performed by: EMERGENCY MEDICINE

## 2021-09-03 PROCEDURE — 6360000004 HC RX CONTRAST MEDICATION: Performed by: EMERGENCY MEDICINE

## 2021-09-03 PROCEDURE — 80053 COMPREHEN METABOLIC PANEL: CPT

## 2021-09-03 PROCEDURE — 84484 ASSAY OF TROPONIN QUANT: CPT

## 2021-09-03 PROCEDURE — 87636 SARSCOV2 & INF A&B AMP PRB: CPT

## 2021-09-03 RX ORDER — IPRATROPIUM BROMIDE AND ALBUTEROL SULFATE 2.5; .5 MG/3ML; MG/3ML
1 SOLUTION RESPIRATORY (INHALATION) ONCE
Status: COMPLETED | OUTPATIENT
Start: 2021-09-03 | End: 2021-09-03

## 2021-09-03 RX ORDER — MECLIZINE HYDROCHLORIDE 25 MG/1
25 TABLET ORAL ONCE
Status: COMPLETED | OUTPATIENT
Start: 2021-09-03 | End: 2021-09-03

## 2021-09-03 RX ORDER — 0.9 % SODIUM CHLORIDE 0.9 %
500 INTRAVENOUS SOLUTION INTRAVENOUS ONCE
Status: COMPLETED | OUTPATIENT
Start: 2021-09-03 | End: 2021-09-04

## 2021-09-03 RX ADMIN — IOPAMIDOL 85 ML: 755 INJECTION, SOLUTION INTRAVENOUS at 23:25

## 2021-09-03 RX ADMIN — SODIUM CHLORIDE 500 ML: 9 INJECTION, SOLUTION INTRAVENOUS at 23:40

## 2021-09-03 RX ADMIN — MECLIZINE HYDROCHLORIDE 25 MG: 25 TABLET ORAL at 23:40

## 2021-09-03 RX ADMIN — IPRATROPIUM BROMIDE AND ALBUTEROL SULFATE 1 AMPULE: .5; 3 SOLUTION RESPIRATORY (INHALATION) at 23:40

## 2021-09-03 ASSESSMENT — PAIN DESCRIPTION - PAIN TYPE: TYPE: ACUTE PAIN

## 2021-09-03 ASSESSMENT — PAIN DESCRIPTION - LOCATION: LOCATION: CHEST

## 2021-09-03 ASSESSMENT — PAIN SCALES - GENERAL: PAINLEVEL_OUTOF10: 5

## 2021-09-04 VITALS
BODY MASS INDEX: 30.62 KG/M2 | DIASTOLIC BLOOD PRESSURE: 79 MMHG | OXYGEN SATURATION: 96 % | SYSTOLIC BLOOD PRESSURE: 131 MMHG | RESPIRATION RATE: 16 BRPM | HEART RATE: 67 BPM | WEIGHT: 184 LBS | TEMPERATURE: 98.1 F

## 2021-09-04 LAB
BILIRUBIN URINE: NEGATIVE
BLOOD, URINE: NEGATIVE
CLARITY: CLEAR
COLOR: YELLOW
EKG ATRIAL RATE: 63 BPM
EKG DIAGNOSIS: NORMAL
EKG P AXIS: 58 DEGREES
EKG P-R INTERVAL: 144 MS
EKG Q-T INTERVAL: 394 MS
EKG QRS DURATION: 72 MS
EKG QTC CALCULATION (BAZETT): 403 MS
EKG R AXIS: 35 DEGREES
EKG T AXIS: 42 DEGREES
EKG VENTRICULAR RATE: 63 BPM
GLUCOSE URINE: NEGATIVE MG/DL
INFLUENZA A: NOT DETECTED
INFLUENZA B: NOT DETECTED
KETONES, URINE: 15 MG/DL
LEUKOCYTE ESTERASE, URINE: NEGATIVE
MICROSCOPIC EXAMINATION: ABNORMAL
NITRITE, URINE: NEGATIVE
PH UA: 7 (ref 5–8)
PROTEIN UA: NEGATIVE MG/DL
SARS-COV-2 RNA, RT PCR: NOT DETECTED
SPECIFIC GRAVITY UA: 1.01 (ref 1–1.03)
TROPONIN: <0.01 NG/ML
URINE TYPE: ABNORMAL
UROBILINOGEN, URINE: 0.2 E.U./DL

## 2021-09-04 PROCEDURE — 93010 ELECTROCARDIOGRAM REPORT: CPT | Performed by: INTERNAL MEDICINE

## 2021-09-04 PROCEDURE — 81003 URINALYSIS AUTO W/O SCOPE: CPT

## 2021-09-04 PROCEDURE — 6360000002 HC RX W HCPCS: Performed by: EMERGENCY MEDICINE

## 2021-09-04 PROCEDURE — 84484 ASSAY OF TROPONIN QUANT: CPT

## 2021-09-04 RX ORDER — ONDANSETRON 4 MG/1
4 TABLET, ORALLY DISINTEGRATING ORAL EVERY 8 HOURS PRN
Qty: 20 TABLET | Refills: 0 | Status: SHIPPED | OUTPATIENT
Start: 2021-09-04

## 2021-09-04 RX ORDER — ONDANSETRON 2 MG/ML
4 INJECTION INTRAMUSCULAR; INTRAVENOUS ONCE
Status: COMPLETED | OUTPATIENT
Start: 2021-09-04 | End: 2021-09-04

## 2021-09-04 RX ORDER — ASPIRIN 81 MG/1
81 TABLET, CHEWABLE ORAL DAILY
Qty: 30 TABLET | Refills: 0 | Status: SHIPPED | OUTPATIENT
Start: 2021-09-04 | End: 2021-10-04

## 2021-09-04 RX ORDER — ASPIRIN 81 MG/1
324 TABLET, CHEWABLE ORAL ONCE
Status: DISCONTINUED | OUTPATIENT
Start: 2021-09-04 | End: 2021-09-04 | Stop reason: HOSPADM

## 2021-09-04 RX ADMIN — ONDANSETRON HYDROCHLORIDE 4 MG: 2 INJECTION, SOLUTION INTRAMUSCULAR; INTRAVENOUS at 02:31

## 2021-09-04 ASSESSMENT — HEART SCORE: ECG: 0

## 2021-09-04 ASSESSMENT — ENCOUNTER SYMPTOMS
BACK PAIN: 0
CHEST TIGHTNESS: 1
NAUSEA: 1
SHORTNESS OF BREATH: 1
DIARRHEA: 0
SORE THROAT: 0
EYE PAIN: 0
VOMITING: 1
COLOR CHANGE: 0
EYE REDNESS: 0
COUGH: 0
SINUS PAIN: 0
STRIDOR: 0
PHOTOPHOBIA: 0
ABDOMINAL PAIN: 0

## 2021-09-04 NOTE — ED PROVIDER NOTES
[address] ED  EMERGENCY DEPARTMENT ENCOUNTER        Pt Name: Obed Ghosh  MRN: [id number]  [place] 1960  Date of evaluation: 9/3/2021  Provider: Tonya Guerrier MD  PCP: Kian Hu MD      [address] [place] [address]       Chief Complaint   Patient presents with    Dizziness     states dizziness and nuasea started tonight around dinner time, one episode of emesis and chest tightness    Nausea    Chest Pain       HISTORY OFPRESENT ILLNESS   (Location/Symptom, Timing/Onset, Context/Setting, Quality, Duration, Modifying Factors,Severity)  Note limiting factors. Obed Ghosh is a 56 y.o. female presenting today due to concern for developing lightheadedness along with feeling slightly off balance this evening around dinnertime which would be roughly 7:30 PM when she also developed some chest tightness with tingling in both hands. She was able to ambulate without any difficulty. She did feel slightly short of breath at the time. She states over the last month she has had some increased intermittent lightheadedness although no syncope. She also reports that going up stairs over the last month has caused some increased shortness of breath and she has had intermittent tightness throughout the last month. She denies any unilateral numbness or weakness but does feel weak all over. She denies any headache. No falls or trauma. No diarrhea. No abdominal pain. No urinary complaints. She had a prior double mastectomy for breast cancer but reports that she believes that she is in remission although has been told by her oncologist that she should be on a medication but she does not want to take this due to side effects. She denies ever receiving  chemotherapy or radiation. She was diagnosed with breast cancer in 2018 but again is supposedly in remission. She denies any pain with breathing or history of blood clots. No abnormal leg swelling.         REVIEW OF SYSTEMS    (2-9 systems for level 4, 10 or more for level 5)     Review of Systems   Constitutional: Positive for fatigue. Negative for activity change, appetite change, chills, diaphoresis and fever. HENT: Negative for congestion, ear pain, sinus pain and sore throat. Eyes: Negative for photophobia, pain, redness and visual disturbance. Respiratory: Positive for chest tightness and shortness of breath. Negative for cough and stridor. Cardiovascular: Positive for chest pain (tightness/pressure). Negative for palpitations and leg swelling. Gastrointestinal: Positive for nausea and vomiting. Negative for abdominal pain and diarrhea. Genitourinary: Negative for difficulty urinating, dysuria, flank pain and pelvic pain. Musculoskeletal: Negative for back pain, gait problem, neck pain and neck stiffness. Skin: Negative for color change and wound. Neurological: Positive for dizziness (mainly lightheaded with standing, slight off-balance, no room-spinning), light-headedness and numbness (tingling to both hands). Negative for syncope, facial asymmetry, speech difficulty, weakness and headaches. Psychiatric/Behavioral: Negative for confusion. The patient is nervous/anxious. Positives and Pertinent negatives as per HPI.       PASTMEDICAL HISTORY     Past Medical History:   Diagnosis Date    Back pain     Bronchitis     Cancer ([address]     breast ca    Headache(784.0)     migraines    Hemorrhoids     Hives     Hyperlipidemia 9/27/2018    Infectious mononucleosis     Pneumonia     Venereal disease          SURGICAL HISTORY       Past Surgical History:   Procedure Laterality Date    BREAST SURGERY      BREAST SURGERY Bilateral 02/09/2018    BILATERAL SIMPLE MASTECTOMIES; RIGHT SENTINEL NODE BIOPSY; BILATERAL FIRST STAGE BREAST RECONSTRUCTION    HYSTERECTOMY      LIPOSUCTION      MASTECTOMY, BILATERAL           CURRENT MEDICATIONS       Discharge Medication List as of 9/4/2021  3:22 AM      CONTINUE these medications which have NOT CHANGED    Details   albuterol sulfate  (90 Base) MCG/ACT inhaler Historical Med             ALLERGIES     No known allergies, Codeine, and Nickel    FAMILY HISTORY       Family History   Problem Relation Age of Onset    Cancer Mother     Heart Disease Mother     Cancer Father           SOCIAL HISTORY       Social History     Socioeconomic History    Marital status:      Spouse name: None    Number of children: None    Years of education: None    Highest education level: None   Occupational History    None   Tobacco Use    Smoking status: Former Smoker     Packs/day: 1.00     Years: 18.00     Pack years: 18.00     Quit date: 2000     Years since quittin.6    Smokeless tobacco: Never Used   Substance and Sexual Activity    Alcohol use: Yes     Comment: very rare    Drug use: No    Sexual activity: Yes     Partners: Male   Other Topics Concern    None   Social History Narrative    None     Social Determinants of Health     Financial Resource Strain:     Difficulty of Paying Living Expenses:    Food Insecurity:     Worried About Running Out of Food in the Last Year:     Ran Out of Food in the Last Year:    Transportation Needs:     Lack of Transportation (Medical):      Lack of Transportation (Non-Medical):    Physical Activity:     Days of Exercise per Week:     Minutes of Exercise per Session:    Stress:     Feeling of Stress :    Social Connections:     Frequency of Communication with Friends and Family:     Frequency of Social Gatherings with Friends and Family:     Attends Synagogue Services:     Active Member of Clubs or Organizations:     Attends Club or Organization Meetings:     Marital Status:    Intimate Partner Violence:     Fear of Current or Ex-Partner:     Emotionally Abused:     Physically Abused:     Sexually Abused:        SCREENINGS    Carlene Coma Scale  Eye Opening: Spontaneous  Best Verbal Response: Oriented  Best Motor Response: Obeys commands  Carlene Coma Scale Score: 15 Heart Score for chest pain patients  History: Moderately Suspicious  ECG: Normal  Patient Age: > 39 and < 65 years  *Risk factors for Atherosclerotic disease: Hypercholesterolemia, Positive family History, Obesity  Risk Factors: > 3 Risk factors or history of atherosclerotic disease*  Troponin: < 1X normal limit  Heart Score Total: 4    History: 1  EC  Patient Age: 1  *Risk factors for Atherosclerotic disease: Hypercholesterolemia;Positive family History; Obesity  Risk Factors: 2  Troponin: 0  Heart Score Total: 4      PHYSICAL EXAM    (up to 7 for level 4, 8 or more for level 5)     ED Triage Vitals   BP Temp Temp Source Pulse Resp SpO2 Height Weight   21 2234 21 22321 -- 21   (!) 156/80 98.1 °F (36.7 °C) Temporal 61 16 97 %  184 lb (83.5 kg)       Physical Exam  Vitals and nursing note reviewed. Constitutional:       General: She is not in acute distress. Appearance: Normal appearance. She is well-developed. She is obese. She is not ill-appearing, toxic-appearing or diaphoretic. Interventions: She is not intubated. HENT:      Head: Normocephalic and atraumatic. Right Ear: External ear normal.      Left Ear: External ear normal.      Nose: Nose normal.      Mouth/Throat:      Mouth: Mucous membranes are moist.      Pharynx: Oropharynx is clear. No oropharyngeal exudate or posterior oropharyngeal erythema. Eyes:      General: Lids are normal. No visual field deficit or scleral icterus. Right eye: No discharge. Left eye: No discharge. Extraocular Movements: Extraocular movements intact. Right eye: Normal extraocular motion and no nystagmus. Left eye: Normal extraocular motion and no nystagmus. Conjunctiva/sclera: Conjunctivae normal.      Pupils: Pupils are equal, round, and reactive to light.  Pupils are equal.      Right eye: Pupil is round, reactive and not sluggish. Left eye: Pupil is round, reactive and not sluggish. Visual Fields: Right eye visual fields normal and left eye visual fields normal.   Neck:      Vascular: No JVD. Trachea: Trachea and phonation normal. No tracheal tenderness or tracheal deviation. Comments: No nuchal rigidity   Cardiovascular:      Rate and Rhythm: Normal rate and regular rhythm. Pulses: Normal pulses. Radial pulses are 2+ on the right side and 2+ on the left side. Heart sounds: Normal heart sounds. No murmur heard. Pulmonary:      Effort: Pulmonary effort is normal. No tachypnea, bradypnea, accessory muscle usage, prolonged expiration, respiratory distress or retractions. She is not intubated. Breath sounds: Normal breath sounds and air entry. No stridor, decreased air movement or transmitted upper airway sounds. No decreased breath sounds, wheezing, rhonchi or rales. Chest:      Chest wall: No tenderness. Abdominal:      General: Abdomen is flat. Bowel sounds are normal. There is no distension. Palpations: Abdomen is soft. Abdomen is not rigid. Tenderness: There is no abdominal tenderness. There is no right CVA tenderness, left CVA tenderness, guarding or rebound. Negative signs include Fall's sign and McBurney's sign. Musculoskeletal:         General: No swelling, tenderness, deformity or signs of injury. Normal range of motion. Cervical back: Full passive range of motion without pain, normal range of motion and neck supple. No edema, erythema, signs of trauma, rigidity, torticollis or crepitus. No pain with movement, spinous process tenderness or muscular tenderness. Normal range of motion. Right lower leg: No edema. Left lower leg: No edema. Skin:     General: Skin is warm and dry. Coloration: Skin is not jaundiced or pale. Findings: No bruising, erythema, lesion or rash.    Neurological:      General: No focal deficit present. Mental Status: She is alert and oriented to person, place, and time. Mental status is at baseline. GCS: GCS eye subscore is 4. GCS verbal subscore is 5. GCS motor subscore is 6. Cranial Nerves: Cranial nerves are intact. No cranial nerve deficit, dysarthria or facial asymmetry. Sensory: Sensation is intact. No sensory deficit. Motor: Motor function is intact. No weakness, tremor, atrophy, abnormal muscle tone, seizure activity or pronator drift. Coordination: Coordination is intact. Romberg sign negative. Coordination normal. Finger-Nose-Finger Test and Heel to [facility] Test normal.      Gait: Gait is intact. Gait normal.      Comments: Negative test of skew  NIHSS = 0 on arrival  Normal ambulation and no ataxia    Psychiatric:         Attention and Perception: Attention normal. She is attentive. Mood and Affect: Affect normal. Mood is depressed. Speech: She is communicative. Speech is delayed. Speech is not slurred. Behavior: Behavior normal. Behavior is cooperative.          Judgment: Judgment normal.             DIAGNOSTIC RESULTS   :    Labs Reviewed   COMPREHENSIVE METABOLIC PANEL - Abnormal; Notable for the following components:       Result Value    Sodium 131 (*)     Potassium 3.4 (*)     Anion Gap -2 (*)     Glucose 111 (*)     ALT 8 (*)     AST 13 (*)     All other components within normal limits    Narrative:     Performed at:  [place] [address],  [facility]   Phone [phone]   URINALYSIS - Abnormal; Notable for the following components:    Ketones, Urine 15 (*)     All other components within normal limits    Narrative:     Performed at:  [place] [address],  [facility]   Phone [phone] & INFLUENZA COMBO    Narrative:     Performed at:  [facility] - [facility], [place], [facility]   Phone [phone]   CBC WITH AUTO DIFFERENTIAL    Narrative:     Performed at:  [facility]) - [facility],  [place], [place]   Phone [phone]   TROPONIN    Narrative:     Performed at:  [facility],  [place], [facility]   Phone [phone]   MAGNESIUM    Narrative:     Performed at:  [facility],  [place], [facility]   Phone [phone]   LIPASE    Narrative:     Performed at:  [facility],  [place], [facility]   Phone [phone]   ETHANOL    Narrative:     Performed at:  [facility],  [place], [facility]   Phone [phone]   BRAIN NATRIURETIC PEPTIDE    Narrative:     Performed at:  [facility],  [place], [facility]   Phone [phone]   TROPONIN    Narrative:     Performed at:  [facility]) - [facility],  [place], [facility]   Phone [phone]       All other labs were within normal range or not returned asof this dictation. EKG: All EKG's are interpreted by the Emergency Department Physician who either signs or Co-signs this chart in the absence of a cardiologist.    The Ekg interpreted by me shows  normal sinus rhythm with a rate of 63  Axis is   Normal  QTc is  normal  Intervals and Durations are unremarkable.       ST Segments: no acute change and normal  No significant change from prior EKG dated - 12/11/12  No STEMI           RADIOLOGY:   Non-plain film images such as CT, Ultrasound and MRI are read by the radiologist. Lul Sack images are visualized and preliminarily interpreted by the  ED Provider with the belowfindings:        Interpretation per the Radiologist below, if available at the time of this note:    CTA HEAD NECK W CONTRAST   Final Result   Proximal basilar artery fenestration extending over 8 mm long segment, a   congenital anomaly. Otherwise, unremarkable CTA of the head and neck. CT HEAD WO CONTRAST   Final Result   Mild atrophy and mild chronic microischemic changes scattered in the deep   white matter which is unchanged with no acute abnormality seen. Resolving chronic sinusitis. XR CHEST PORTABLE   Final Result   No acute disease. Spoke to radiologist and at this point they stated this is just an incidental finding and just however get imaging in the future to ensure no sign of aneurysm forming but no significant findings on CT at this time. Patient was informed of this incidental finding and to follow-up with primary doctor for further outpatient imaging and that she may ultimately need to see neurosurgery at some point in the future. PROCEDURES   Unless otherwise noted below, none     Procedures    CRITICAL CARE TIME   N/A    CONSULTS:  None    EMERGENCY DEPARTMENT COURSE and DIFFERENTIAL DIAGNOSIS/MDM:   Vitals:    Vitals:    09/03/21 2340 09/04/21 0017 09/04/21 0117 09/04/21 0222   BP:  135/82 137/76 131/79   Pulse:  71 74 67   Resp: 12 13 15 16   Temp:       TempSrc:       SpO2: 97% 98% 97% 96%   Weight:           Patient was given the following medications:  Medications   0.9 % sodium chloride bolus (0 mLs IntraVENous Stopped 9/4/21 0046)   meclizine (ANTIVERT) tablet 25 mg (25 mg Oral Given 9/3/21 2340)   ipratropium-albuterol (DUONEB) nebulizer solution 1 ampule (1 ampule Inhalation Given 9/3/21 2340)   iopamidol (ISOVUE-370) 76 % injection 85 mL (85 mLs IntraVENous Given 9/3/21 2325)   ondansetron (ZOFRAN) injection 4 mg (4 mg IntraVENous Given 9/4/21 0231)     Patient was evaluated due to concern for chest tightness with shortness of breath along with some lightheadedness over the last month with increased shortness of breath with exertion up to stairs.   She denies any pain with breathing and story not suggestive of pulmonary embolism. She did complain of some dizziness mainly associate with lightheadedness but I did order a CTA of the head and neck to evaluate for any signs of atherosclerosis. There is no significant findings per radiologist other than incidental finding related to the basilar artery which patient was made aware of. Chest discomfort improved while in the emergency department. HEART score = 4. The patient had full mental capacity to make her own medical decisions. Upon repeat assessment she was speaking normally again with good rate and answering quickly. She had slightly delayed speech upon arrival although at that point states she did not feel well and this may be related to the recent vomiting. She did vomit a second time while in the emergency department although had no abdominal tenderness to palpation and following the second episode denied feeling nauseous again. We did give Zofran at this point. We gave meclizine although she states this did not do anything for her symptoms. Story not suggestive of TIA/stroke. Based on normal neurological exam, and based on [facility] clinical management tool for posterior stroke evaluation, she had a normal exam and at this time MRI is unlikely to provide any additional assistance. However, I did recommend admission due to concern for chest pain with elevated heart score and patient being at moderate risk for possible serious cardiac event over the next 6 weeks such as a heart attack that could cause severe disability or death. She is aware that she has a 15% chance of this happening. Since she had full mental capacity to make her own medical decisions, I did have a discussion with her about this. I did recommend admission but at this time she is declining and is willing to accept this risk of going home.   She is aware that if she does develop any worsening chest discomfort associated with shortness of breath, numbness or weakness on one side the body, severe trouble walking, or any other concerns, then return immediately to the ED for repeat assessment but otherwise follow-up urgently with her primary doctor or cardiology over the next 2 to 3 days for repeat assessment and possible urgent outpatient stress test.  She was told to start taking aspirin daily until told otherwise by her doctor. She was well-appearing and in no acute distress at time of discharge and the patient along with her  felt comfortable with this plan and again at this time she was exercising informed refusal in regards to not wanting to be admitted and wanted to go home at this time. She also knows to potentially need repeat imaging of her basilar artery over the next couple years but talk to her primary doctor as to when the the right time for follow-up for this. The patient tolerated their visit well. The patient and / or the family were informed of the results of any tests, a time was given to answer questions. FINAL IMPRESSION      1. Chest pain, unspecified type    2. Lightheadedness    3. Non-intractable vomiting with nausea, unspecified vomiting type    4.  Congenital fenestration of basilar artery          DISPOSITION/PLAN   DISPOSITION Decision To Discharge 09/04/2021 03:17:50 AM      PAT[facility]:  [facility]  [address] [facility]  [phone]  Go to   If symptoms worsen    Sven Toth MD  [address] [place] [address] [address]. [address] [facility]  [phone]    Schedule an appointment as soon as possible for a visit in 2 days      [address] [place] [address] [address]  [phone]    In 2 days        DISCHARGEMEDICATIONS:  Discharge Medication List as of 9/4/2021  3:22 AM      START taking these medications    Details   aspirin (ASPIRIN CHILDRENS) 81 MG chewable tablet Take 1 tablet by mouth daily, Disp-30 tablet, R-0Print      ondansetron (Darrell Sun ODT) 4 MG disintegrating tablet Take 1 tablet by mouth every 8 hours as needed for Nausea, Disp-20 tablet, R-0Print             DISCONTINUED MEDICATIONS:  Discharge Medication List as of 9/4/2021  3:22 AM                 (Please note that portions of this note were completed with a voicerecognition program.  Efforts were made to edit the dictations but occasionally words are mis-transcribed.)    Fina Sterling MD (electronically signed)            Fina Sterling MD  09/04/21 7379

## 2024-02-23 ENCOUNTER — HOSPITAL ENCOUNTER (OUTPATIENT)
Age: 64
Discharge: HOME | End: 2024-02-23
Payer: MEDICARE

## 2024-02-23 ENCOUNTER — HOSPITAL ENCOUNTER (OUTPATIENT)
Dept: GENERAL RADIOLOGY | Age: 64
Discharge: HOME | End: 2024-02-23
Payer: MEDICARE

## 2024-02-23 DIAGNOSIS — R06.2 WHEEZING: ICD-10-CM

## 2024-02-23 PROCEDURE — 71046 X-RAY EXAM CHEST 2 VIEWS: CPT

## 2024-05-24 ENCOUNTER — HOSPITAL ENCOUNTER (OUTPATIENT)
Age: 64
Setting detail: SPECIMEN
Discharge: HOME | End: 2024-05-24
Payer: MEDICARE

## 2024-05-24 LAB — C DIFF TOX A+B STL QL IA: NORMAL

## 2024-05-24 PROCEDURE — 87449 NOS EACH ORGANISM AG IA: CPT

## 2024-05-24 PROCEDURE — 87324 CLOSTRIDIUM AG IA: CPT

## 2024-05-24 PROCEDURE — 82653 EL-1 FECAL QUANTITATIVE: CPT

## 2024-05-24 PROCEDURE — 83993 ASSAY FOR CALPROTECTIN FECAL: CPT

## 2024-05-24 PROCEDURE — 87506 IADNA-DNA/RNA PROBE TQ 6-11: CPT

## 2024-05-24 PROCEDURE — 82705 FATS/LIPIDS FECES QUAL: CPT

## 2024-05-25 LAB — GI PATHOGENS PNL STL NAA+PROBE: NORMAL

## 2024-05-26 LAB
CALPROTECTIN STL-MCNT: 11 UG/G
FAT STL QL: NORMAL
NEUTRAL FAT STL QL: NORMAL

## 2024-05-27 LAB — ELASTASE PANC STL-MCNT: 396 UG/G

## 2025-04-23 ENCOUNTER — HOSPITAL ENCOUNTER (OUTPATIENT)
Age: 65
Discharge: HOME | End: 2025-04-23
Payer: MEDICARE

## 2025-04-23 ENCOUNTER — HOSPITAL ENCOUNTER (OUTPATIENT)
Dept: GENERAL RADIOLOGY | Age: 65
Discharge: HOME | End: 2025-04-23
Payer: MEDICARE

## 2025-04-23 DIAGNOSIS — G89.29 CHRONIC NECK PAIN: ICD-10-CM

## 2025-04-23 DIAGNOSIS — M54.2 CHRONIC NECK PAIN: ICD-10-CM

## 2025-04-23 DIAGNOSIS — R06.09 DOE (DYSPNEA ON EXERTION): ICD-10-CM

## 2025-04-23 PROCEDURE — 72040 X-RAY EXAM NECK SPINE 2-3 VW: CPT

## 2025-04-23 PROCEDURE — 71046 X-RAY EXAM CHEST 2 VIEWS: CPT

## 2025-07-29 ENCOUNTER — TRANSCRIBE ORDERS (OUTPATIENT)
Dept: ADMINISTRATIVE | Age: 65
End: 2025-07-29

## 2025-07-29 DIAGNOSIS — N28.1 SIMPLE RENAL CYST: Primary | ICD-10-CM

## 2025-08-13 ENCOUNTER — TRANSCRIBE ORDERS (OUTPATIENT)
Dept: ADMINISTRATIVE | Age: 65
End: 2025-08-13

## 2025-08-13 DIAGNOSIS — R91.1 LUNG NODULE: Primary | ICD-10-CM

## 2025-08-28 ENCOUNTER — TRANSCRIBE ORDERS (OUTPATIENT)
Dept: ADMINISTRATIVE | Age: 65
End: 2025-08-28

## 2025-08-28 DIAGNOSIS — R91.1 LUNG NODULE: Primary | ICD-10-CM
